# Patient Record
Sex: FEMALE
[De-identification: names, ages, dates, MRNs, and addresses within clinical notes are randomized per-mention and may not be internally consistent; named-entity substitution may affect disease eponyms.]

---

## 2017-10-27 NOTE — REPMRS
Patient History

The patient states she has not had a clinical breast exam in over

a year.

No known family history of cancer.

 

Digital Mammo Screening Bilat: October 27, 2017 - Exam #: 

[id number]

Bilateral CC and MLO view(s) were taken.

 

Technologist: Sonia Jackson, Technologist

Prior study comparison: May 18, 2016, bilateral digital mammo 

screening bilat performed at [facility].  January 

15, 2015, bilateral digital mammo screening bilat performed at 

[facility].

 

FINDINGS: The breast tissue is heterogeneously dense.  This may 

lower the sensitivity of mammography.  There has been no change 

in the appearance of the mammogram from the prior studies.  There

is a moderate amount of residual fibroglandular tissue which is 

fairly symmetric. There is no interval development of dominant 

mass, areas of architectural distortion, or clustered 

microcalcification typical of malignancy.

 

ASSESSMENT: BI-RADS/ACR category 1 mammogram. Negative.

 

Recommendation

Routine screening mammogram in 1 year (for women over age 40).

This mammogram was interpreted with the aid of an FDA-approved 

computer-aided dectection system.

 

Electronically Signed By: Seth Sandhu MD 10/27/17 [id number]

## 2018-07-16 ENCOUNTER — HOSPITAL ENCOUNTER (OUTPATIENT)
Dept: HOSPITAL 53 - M LRY | Age: 43
End: 2018-07-16
Attending: PHYSICIAN ASSISTANT
Payer: COMMERCIAL
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
## 2018-07-16 DIAGNOSIS — S59.902A: Primary | ICD-10-CM
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
## 2018-07-16 PROCEDURE — 73080 X-RAY EXAM OF ELBOW: CPT
